# Patient Record
Sex: MALE | Race: WHITE | NOT HISPANIC OR LATINO | URBAN - METROPOLITAN AREA
[De-identification: names, ages, dates, MRNs, and addresses within clinical notes are randomized per-mention and may not be internally consistent; named-entity substitution may affect disease eponyms.]

---

## 2023-09-23 ENCOUNTER — HOSPITAL ENCOUNTER (EMERGENCY)
Facility: OTHER | Age: 23
Discharge: HOME | End: 2023-09-23
Attending: EMERGENCY MEDICINE

## 2023-09-23 VITALS
RESPIRATION RATE: 16 BRPM | DIASTOLIC BLOOD PRESSURE: 61 MMHG | SYSTOLIC BLOOD PRESSURE: 108 MMHG | HEART RATE: 83 BPM | OXYGEN SATURATION: 99 % | TEMPERATURE: 98 F

## 2023-09-23 DIAGNOSIS — F10.920 ALCOHOLIC INTOXICATION WITHOUT COMPLICATION: Primary | ICD-10-CM

## 2023-09-23 LAB — POCT GLUCOSE: 90 MG/DL (ref 70–110)

## 2023-09-23 PROCEDURE — 82962 GLUCOSE BLOOD TEST: CPT

## 2023-09-23 PROCEDURE — 96375 TX/PRO/DX INJ NEW DRUG ADDON: CPT

## 2023-09-23 PROCEDURE — 96374 THER/PROPH/DIAG INJ IV PUSH: CPT

## 2023-09-23 PROCEDURE — 63600175 PHARM REV CODE 636 W HCPCS: Performed by: EMERGENCY MEDICINE

## 2023-09-23 PROCEDURE — 99284 EMERGENCY DEPT VISIT MOD MDM: CPT | Mod: 25

## 2023-09-23 RX ORDER — ONDANSETRON 2 MG/ML
4 INJECTION INTRAMUSCULAR; INTRAVENOUS
Status: COMPLETED | OUTPATIENT
Start: 2023-09-23 | End: 2023-09-23

## 2023-09-23 RX ORDER — LORAZEPAM 2 MG/ML
1 INJECTION INTRAMUSCULAR
Status: COMPLETED | OUTPATIENT
Start: 2023-09-23 | End: 2023-09-23

## 2023-09-23 RX ADMIN — ONDANSETRON 4 MG: 2 INJECTION INTRAMUSCULAR; INTRAVENOUS at 03:09

## 2023-09-23 RX ADMIN — LORAZEPAM 1 MG: 2 INJECTION INTRAMUSCULAR; INTRAVENOUS at 04:09

## 2023-09-23 NOTE — ED PROVIDER NOTES
"Encounter Date: 9/23/2023    SCRIBE #1 NOTE: I, Gayathri Reyesday, am scribing for, and in the presence of,  Oralia Umana MD. I have scribed the following portions of the note - Other sections scribed: HPI, ROS, PE.       History     Chief Complaint   Patient presents with    Alcohol Intoxication     Pt friends report pt had half a drink and is not "acting strange" found sitting on park bench out side dorm     Time seen by provider: 3:15 AM    This is a 23 y.o. male who presents with alcohol intoxication after drinking an unknown drink. Patient states that he does not feel good and even talking is difficult. He also states he feels very cold.  Patient is concerned that he may have been poisoned with the unknown drink.  He does admit to drinking a few shots of alcohol prior.  This is the extent of the patient's complaints at this time.          The history is provided by the patient, medical records and the EMS personnel. The history is limited by the condition of the patient. No  was used.     Review of patient's allergies indicates:  No Known Allergies  No past medical history on file.  No past surgical history on file.  No family history on file.     Review of Systems   Unable to perform ROS: Mental status change       Physical Exam     Initial Vitals [09/23/23 0258]   BP Pulse Resp Temp SpO2   115/70 70 16 97.5 °F (36.4 °C) 96 %      MAP       --         Physical Exam    Nursing note and vitals reviewed.  Constitutional: He appears well-developed and well-nourished. He is not diaphoretic.   Lethargic but arousable with aggressive stimuli. Tolerating secretions.  Protecting his airway   HENT:   Head: Normocephalic and atraumatic.   Eyes: Conjunctivae are normal.   Neck: Neck supple.   Cardiovascular:  Normal rate and regular rhythm.           Pulmonary/Chest: Breath sounds normal. No respiratory distress. He has no wheezes.   Abdominal: Abdomen is soft. Bowel sounds are normal. " "  Musculoskeletal:         General: No edema.      Cervical back: Neck supple.     Neurological: He is alert and oriented to person, place, and time.   Skin: Skin is warm and dry.   Psychiatric: He has a normal mood and affect.         ED Course   Procedures  Labs Reviewed   POCT GLUCOSE MONITORING CONTINUOUS          Imaging Results    None          Medications   ondansetron injection 4 mg (4 mg Intravenous Given 9/23/23 0311)   LORazepam injection 1 mg (1 mg Intravenous Given 9/23/23 0429)     Medical Decision Making  3:15AM:  Patient is a 23-year-old male who presents to the emergency department with alcohol intoxication.  Patient admits to drinking, denies any drugs.  He is lethargic but protecting his airway.  Will plan for cardiac monitoring, will continue to follow and reassess.    Amount and/or Complexity of Data Reviewed  Independent Historian: EMS  Labs: ordered. Decision-making details documented in ED Course.    Risk  Prescription drug management.    4:20AM:  Patient having episodes of rigors, states he is cold, and "does not know what is happening."  He is awake during these episodes.  Patient states that in the past, he has had episodes similar to thisafter drinking and getting "poisoned."  Will plan for a dose of Ativan, will continue to follow.    6:32 AM:  Patient doing well, feeling ready to go.  He is ambulatory with steady gait.  I do not feel that further work up in the ED is indicated at this time.  I updated pt regarding results and I counseled pt regarding supportive care measures.  I have discussed with the pt ED return warnings and need for close PCP f/u.  Pt agreeable to plan and all questions answered.  I feel that pt is stable for discharge and management as an outpatient and no further intervention is needed at this time.  Pt is comfortable returning to the ED if needed.  Will DC home in stable condition.          Scribe Attestation:   Scribe #1: I performed the above scribed service " and the documentation accurately describes the services I performed. I attest to the accuracy of the note.              Physician Attestation for Scribe: I, Oralia Umana, reviewed documentation as scribed in my presence, which is both accurate and complete.            Clinical Impression:   Final diagnoses:  [F10.920] Alcoholic intoxication without complication (Primary)               Oralia Umana MD  09/23/23 0546       Oralia Umana MD  09/23/23 0633

## 2023-09-23 NOTE — ED TRIAGE NOTES
"Rasta Ratliff, an 23 y.o. male presents to the ED with ETOH intoxication. Pt reports "having a few shots". Pt drowsy, GCS 13. Denies drug use. Denies medical hx.       Chief Complaint   Patient presents with    Alcohol Intoxication     Pt friends report pt had half a drink and is not "acting strange" found sitting on park bench out side dorm     Review of patient's allergies indicates:  No Known Allergies  No past medical history on file.    "